# Patient Record
Sex: FEMALE | NOT HISPANIC OR LATINO | URBAN - METROPOLITAN AREA
[De-identification: names, ages, dates, MRNs, and addresses within clinical notes are randomized per-mention and may not be internally consistent; named-entity substitution may affect disease eponyms.]

---

## 2017-11-11 ENCOUNTER — EMERGENCY (EMERGENCY)
Facility: HOSPITAL | Age: 32
LOS: 1 days | Discharge: HOME | End: 2017-11-11
Attending: EMERGENCY MEDICINE | Admitting: EMERGENCY MEDICINE
Payer: MEDICAID

## 2017-11-11 VITALS
RESPIRATION RATE: 18 BRPM | DIASTOLIC BLOOD PRESSURE: 81 MMHG | SYSTOLIC BLOOD PRESSURE: 114 MMHG | OXYGEN SATURATION: 100 % | TEMPERATURE: 98 F | HEART RATE: 71 BPM

## 2017-11-11 LAB
ALBUMIN SERPL ELPH-MCNC: 4.3 G/DL — SIGNIFICANT CHANGE UP (ref 3.3–5)
ALP SERPL-CCNC: 58 U/L — SIGNIFICANT CHANGE UP (ref 40–120)
ALT FLD-CCNC: 18 U/L — SIGNIFICANT CHANGE UP (ref 4–33)
APPEARANCE UR: CLEAR — SIGNIFICANT CHANGE UP
AST SERPL-CCNC: 19 U/L — SIGNIFICANT CHANGE UP (ref 4–32)
BASOPHILS # BLD AUTO: 0.02 K/UL — SIGNIFICANT CHANGE UP (ref 0–0.2)
BASOPHILS NFR BLD AUTO: 0.2 % — SIGNIFICANT CHANGE UP (ref 0–2)
BILIRUB SERPL-MCNC: 0.2 MG/DL — SIGNIFICANT CHANGE UP (ref 0.2–1.2)
BILIRUB UR-MCNC: NEGATIVE — SIGNIFICANT CHANGE UP
BLOOD UR QL VISUAL: HIGH
BUN SERPL-MCNC: 15 MG/DL — SIGNIFICANT CHANGE UP (ref 7–23)
CALCIUM SERPL-MCNC: 8.8 MG/DL — SIGNIFICANT CHANGE UP (ref 8.4–10.5)
CHLORIDE SERPL-SCNC: 105 MMOL/L — SIGNIFICANT CHANGE UP (ref 98–107)
CO2 SERPL-SCNC: 26 MMOL/L — SIGNIFICANT CHANGE UP (ref 22–31)
COLOR SPEC: YELLOW — SIGNIFICANT CHANGE UP
CREAT SERPL-MCNC: 0.58 MG/DL — SIGNIFICANT CHANGE UP (ref 0.5–1.3)
EOSINOPHIL # BLD AUTO: 0.22 K/UL — SIGNIFICANT CHANGE UP (ref 0–0.5)
EOSINOPHIL NFR BLD AUTO: 2.1 % — SIGNIFICANT CHANGE UP (ref 0–6)
GLUCOSE SERPL-MCNC: 106 MG/DL — HIGH (ref 70–99)
GLUCOSE UR-MCNC: NEGATIVE — SIGNIFICANT CHANGE UP
HCG SERPL-ACNC: < 5 MIU/ML — SIGNIFICANT CHANGE UP
HCT VFR BLD CALC: 37.5 % — SIGNIFICANT CHANGE UP (ref 34.5–45)
HGB BLD-MCNC: 12.3 G/DL — SIGNIFICANT CHANGE UP (ref 11.5–15.5)
IMM GRANULOCYTES # BLD AUTO: 0.03 # — SIGNIFICANT CHANGE UP
IMM GRANULOCYTES NFR BLD AUTO: 0.3 % — SIGNIFICANT CHANGE UP (ref 0–1.5)
KETONES UR-MCNC: NEGATIVE — SIGNIFICANT CHANGE UP
LEUKOCYTE ESTERASE UR-ACNC: SIGNIFICANT CHANGE UP
LYMPHOCYTES # BLD AUTO: 3.3 K/UL — SIGNIFICANT CHANGE UP (ref 1–3.3)
LYMPHOCYTES # BLD AUTO: 31.9 % — SIGNIFICANT CHANGE UP (ref 13–44)
MCHC RBC-ENTMCNC: 27.3 PG — SIGNIFICANT CHANGE UP (ref 27–34)
MCHC RBC-ENTMCNC: 32.8 % — SIGNIFICANT CHANGE UP (ref 32–36)
MCV RBC AUTO: 83.1 FL — SIGNIFICANT CHANGE UP (ref 80–100)
MONOCYTES # BLD AUTO: 0.64 K/UL — SIGNIFICANT CHANGE UP (ref 0–0.9)
MONOCYTES NFR BLD AUTO: 6.2 % — SIGNIFICANT CHANGE UP (ref 2–14)
MUCOUS THREADS # UR AUTO: SIGNIFICANT CHANGE UP
NEUTROPHILS # BLD AUTO: 6.14 K/UL — SIGNIFICANT CHANGE UP (ref 1.8–7.4)
NEUTROPHILS NFR BLD AUTO: 59.3 % — SIGNIFICANT CHANGE UP (ref 43–77)
NITRITE UR-MCNC: NEGATIVE — SIGNIFICANT CHANGE UP
NRBC # FLD: 0 — SIGNIFICANT CHANGE UP
PH UR: 6.5 — SIGNIFICANT CHANGE UP (ref 4.6–8)
PLATELET # BLD AUTO: 268 K/UL — SIGNIFICANT CHANGE UP (ref 150–400)
PMV BLD: 10.1 FL — SIGNIFICANT CHANGE UP (ref 7–13)
POTASSIUM SERPL-MCNC: 4.5 MMOL/L — SIGNIFICANT CHANGE UP (ref 3.5–5.3)
POTASSIUM SERPL-SCNC: 4.5 MMOL/L — SIGNIFICANT CHANGE UP (ref 3.5–5.3)
PROT SERPL-MCNC: 7.8 G/DL — SIGNIFICANT CHANGE UP (ref 6–8.3)
PROT UR-MCNC: 20 — SIGNIFICANT CHANGE UP
RBC # BLD: 4.51 M/UL — SIGNIFICANT CHANGE UP (ref 3.8–5.2)
RBC # FLD: 13.1 % — SIGNIFICANT CHANGE UP (ref 10.3–14.5)
RBC CASTS # UR COMP ASSIST: >50 — HIGH (ref 0–?)
SODIUM SERPL-SCNC: 143 MMOL/L — SIGNIFICANT CHANGE UP (ref 135–145)
SP GR SPEC: 1.02 — SIGNIFICANT CHANGE UP (ref 1–1.03)
SQUAMOUS # UR AUTO: SIGNIFICANT CHANGE UP
UROBILINOGEN FLD QL: NORMAL E.U. — SIGNIFICANT CHANGE UP (ref 0.1–0.2)
WBC # BLD: 10.35 K/UL — SIGNIFICANT CHANGE UP (ref 3.8–10.5)
WBC # FLD AUTO: 10.35 K/UL — SIGNIFICANT CHANGE UP (ref 3.8–10.5)
WBC UR QL: SIGNIFICANT CHANGE UP (ref 0–?)

## 2017-11-11 PROCEDURE — 76830 TRANSVAGINAL US NON-OB: CPT | Mod: 26

## 2017-11-11 PROCEDURE — 99284 EMERGENCY DEPT VISIT MOD MDM: CPT

## 2017-11-11 RX ORDER — SODIUM CHLORIDE 9 MG/ML
1000 INJECTION INTRAMUSCULAR; INTRAVENOUS; SUBCUTANEOUS ONCE
Qty: 0 | Refills: 0 | Status: COMPLETED | OUTPATIENT
Start: 2017-11-11 | End: 2017-11-11

## 2017-11-11 RX ORDER — ACETAMINOPHEN 500 MG
975 TABLET ORAL ONCE
Qty: 0 | Refills: 0 | Status: COMPLETED | OUTPATIENT
Start: 2017-11-11 | End: 2017-11-11

## 2017-11-11 RX ADMIN — Medication 975 MILLIGRAM(S): at 21:34

## 2017-11-11 RX ADMIN — SODIUM CHLORIDE 4000 MILLILITER(S): 9 INJECTION INTRAMUSCULAR; INTRAVENOUS; SUBCUTANEOUS at 21:34

## 2017-11-11 NOTE — ED ADULT TRIAGE NOTE - CHIEF COMPLAINT QUOTE
Pt c/o of possible dislodged IUD, has been having sever pain in cervial region, cannot get confortable, has begun bleeding today but it is not her time for her period LMP 10/18 was normal; IUD was placed 4 yrs ago. Pt c/o of vaginal bleeding, possible dislodged IUD, has been having severe pain in vagina/cervix cannot get comfortable, has begun bleeding today but it is not her time for her period LMP 10/18 was normal; IUD was placed 4 yrs ago.  No difficulty urinating.

## 2017-11-11 NOTE — ED ADULT NURSE NOTE - OBJECTIVE STATEMENT
pt arrives w/ c/o IUD malfunction. pt states she had IUD placed 4 years ago and when she had intercourse last night she felt like something broke and now has discomfort in her vagina. pt   states that he can feel the device. pt states after intercourse she now has vaginal bleeding but thinks her period came early and not having pain or large clots from the bleeding. pt appears comfortable and in NAD. waiting further orders will continue to monitor.

## 2017-11-11 NOTE — ED PROVIDER NOTE - MEDICAL DECISION MAKING DETAILS
likely a dislodged IUD, blood loss does not seem significant given no clots no tachycardia no lightheadedness. Plan: labs, ultrasound, if dislodged iud --> OB consult, pain control

## 2017-11-11 NOTE — ED ADULT NURSE NOTE - CHIEF COMPLAINT QUOTE
Pt c/o of vaginal bleeding, possible dislodged IUD, has been having severe pain in vagina/cervix cannot get comfortable, has begun bleeding today but it is not her time for her period LMP 10/18 was normal; IUD was placed 4 yrs ago.  No difficulty urinating.

## 2017-11-11 NOTE — ED PROVIDER NOTE - ATTENDING CONTRIBUTION TO CARE
[place]to  pt with 1 day of vaginal pain worse with intercourse  feels IUD was moving  denies discharge but states period began 5 days early  no abd pain  dysuria  fever  cough  SOB  exam  pt in NAD clear lungs  card RRR S1S2  abd benign [place]  pt with 1 day of vaginal pain worse with intercourse  feels IUD was moving  denies discharge but states period began 5 days early  no abd pain  dysuria  fever  cough  SOB  exam  pt in NAD clear lungs  card RRR S1S2  abd benign  pelvic- small blood at os  obcures full eval  of cervix  no adnexal tenderness

## 2017-11-11 NOTE — ED PROVIDER NOTE - OBJECTIVE STATEMENT
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 After, began having vag bleeding similar to usual period bleeding, brick red, no clots, 4-5 pads per day, similar amount to usual periods. Had IUD placed 4 years ago. Has not had this before. Has also had some white discharge for several months, treated with flagyl gel 3 months ago, not better. LMP 10/18.     PMD: Aron  OB: none  ROS: Denies fever, palpitations, chills, recent sickness, HA, vision changes, cough, SOB, chest pain, n/v/d/c, dysuria, hematuria, rash, new joint aches, sick contacts, easy bruising, and recent travel.

## 2017-11-11 NOTE — ED PROVIDER NOTE - PHYSICAL EXAMINATION
Gen: NAD, AOx3  Head: NCAT  HEENT: PERRL, oral mucosa moist, normal conjunctiva  Lung: CTAB, no respiratory distress  CV: rrr, no murmurs, Normal perfusion  Abd: soft, suprapubic tenderness, no CVA tenderness  : no external lesions, speculum - small blood in vaginal vault, no clots, cervix not visualized, bimanual - no cmt, central uterine tenderness, no adnexal tenderness   MSK: No edema, no visible deformities  Neuro: No focal neurologic deficits  Skin: No rash   Psych: normal affect

## 2017-11-12 VITALS
RESPIRATION RATE: 16 BRPM | SYSTOLIC BLOOD PRESSURE: 94 MMHG | DIASTOLIC BLOOD PRESSURE: 62 MMHG | OXYGEN SATURATION: 100 % | HEART RATE: 70 BPM

## 2017-11-12 DIAGNOSIS — T83.89XA OTHER SPECIFIED COMPLICATION OF GENITOURINARY PROSTHETIC DEVICES, IMPLANTS AND GRAFTS, INITIAL ENCOUNTER: ICD-10-CM

## 2017-11-12 PROCEDURE — 99283 EMERGENCY DEPT VISIT LOW MDM: CPT

## 2017-11-12 NOTE — CONSULT NOTE ADULT - PROBLEM SELECTOR RECOMMENDATION 9
- IUD in lower uterine segment, removed at bedside. Intact Multiload 375 IUD identified  - Bleeding consistent with normal periods  Pt counseled regarding contraceptive options, stressing that she is currently not protected and needs to use condoms while she decides. Pt to follow up with GYN outpatient. Contact info for [facility] provided.    Patient seen with Dr. Carter Smith, PGY2

## 2017-11-12 NOTE — CONSULT NOTE ADULT - SUBJECTIVE AND OBJECTIVE BOX
R2 GYN Consult Note    31yo  LMP 17       OBHx:  x2  GynHx: reg menses, no f/c/STIs  PMSH: s/p brain surgery for removal of benign tumor  All: NKDA  Meds: none  SocialHx: no smoking or drugs    Vital Signs Last 24 Hrs  T(C): 37 (2017 20:38), Max: 37 (2017 20:38)  T(F): 98.6 (2017 20:38), Max: 98.6 (2017 20:38)  HR: 70 (2017 00:19) (70 - 78)  BP: 94/62 (2017 00:19) (94/62 - 114/81)  RR: 16 (2017 00:19) (16 - 18)  SpO2: 100% (2017 00:19) (100% - 100%)    PE:  Gen: Comfortable, NAD  CV: RRR  Pulm: CTAB  Abd: Soft, NT  Ext: No edema or tenderness bilaterally  Spec Exam:    LABS:                        12.3   10.35 )-----------( 268      ( 2017 20:56 )             37.5     -    143  |  105  |  15  ----------------------------<  106<H>  4.5   |  26  |  0.58    Ca    8.8      2017 20:56    TPro  7.8  /  Alb  4.3  /  TBili  0.2  /  DBili  x   /  AST  19  /  ALT  18  /  AlkPhos  58  -      Urinalysis Basic - ( 2017 21:41 )    Color: YELLOW / Appearance: CLEAR / S.023 / pH: 6.5  Gluc: NEGATIVE / Ketone: NEGATIVE  / Bili: NEGATIVE / Urobili: NORMAL E.U.   Blood: MODERATE / Protein: 20 / Nitrite: NEGATIVE   Leuk Esterase: TRACE / RBC: >50 / WBC 0-2   Sq Epi: OCC / Non Sq Epi: x / Bacteria: x        RADIOLOGY & ADDITIONAL STUDIES:  < from: US Transvaginal (17 @ 23:52) >  Endometrium: 7 mm. Within normal limits. IUD abnormally positioned in the   lower uterine segment and is abutting the internal osof the cervix.    Right ovary: 2.4 x 2.3 x 2.2 cm. Within normal limits.    Left ovary: 2.2 x 2.9 x 2.3 cm. Within normal limits.    Fluid: None.    IMPRESSION:    Abnormally positioned IUD in the lower uterine segment with open cervix. R2 GYN Consult Note    31yo  LMP 17 presenting with complaints of acute onset intermittent pelvic pain that started during intercourse yesterday. Pt reports that her partner also felt something sharp, and since then she has been experiencing sharp crampy pain intermittently with changes in position. She has never felt pain like this before. Pt reports vaginal bleeding starting today, about 4 days earlier than expected. Per patient, In first two days, she uses 4-5 pads, and this is consistent with her normal period. Patient reports being treated for with antibiotics for an episode of vaginal discharge 3 months ago, this has since resolved.  Patient had IUD placed 4 years ago in [place], she is not sure what type of IUD or how long it is effective for. She has not seen a GYN in about 3 years since moving to . No Pap smears    OBHx:  x2  GynHx: reg menses, no f/c/STIs  PMSH: s/p brain surgery for removal of benign tumor  All: NKDA  Meds: none  SocialHx: no smoking or drugs    Vital Signs Last 24 Hrs  T(C): 37 (2017 20:38), Max: 37 (2017 20:38)  T(F): 98.6 (2017 20:38), Max: 98.6 (2017 20:38)  HR: 70 (2017 00:19) (70 - 78)  BP: 94/62 (2017 00:19) (94/62 - 114/81)  RR: 16 (2017 00:19) (16 - 18)  SpO2: 100% (2017 00:19) (100% - 100%)    PE:  Gen: Comfortable, NAD  CV: RRR  Pulm: CTAB  Abd: Soft, NT  Ext: No edema or tenderness bilaterally  Spec Exam: normal appearing cervix with visible strings protruding from os, base of IUD barely visible. Strings grasped with forceps and IUD removed intact  Bimanual: ~8cm anteverted uterus nontender, no CMT, no palpable adnexal masses or tenderness    LABS:                        12.3   10.35 )-----------( 268      ( 2017 20:56 )             37.5     11-11    143  |  105  |  15  ----------------------------<  106<H>  4.5   |  26  |  0.58    Ca    8.8      2017 20:56    TPro  7.8  /  Alb  4.3  /  TBili  0.2  /  DBili  x   /  AST  19  /  ALT  18  /  AlkPhos  58  11-11      Urinalysis Basic - ( 2017 21:41 )    Color: YELLOW / Appearance: CLEAR / S.023 / pH: 6.5  Gluc: NEGATIVE / Ketone: NEGATIVE  / Bili: NEGATIVE / Urobili: NORMAL E.U.   Blood: MODERATE / Protein: 20 / Nitrite: NEGATIVE   Leuk Esterase: TRACE / RBC: >50 / WBC 0-2   Sq Epi: OCC / Non Sq Epi: x / Bacteria: x        RADIOLOGY & ADDITIONAL STUDIES:  < from: US Transvaginal (17 @ 23:52) >  Endometrium: 7 mm. Within normal limits. IUD abnormally positioned in the   lower uterine segment and is abutting the internal osof the cervix.    Right ovary: 2.4 x 2.3 x 2.2 cm. Within normal limits.    Left ovary: 2.2 x 2.9 x 2.3 cm. Within normal limits.    Fluid: None.    IMPRESSION:    Abnormally positioned IUD in the lower uterine segment with open cervix.

## 2017-11-12 NOTE — CONSULT NOTE ADULT - ASSESSMENT
31yo  LMP 17 with acute onset bilateral pelvic pain/cramping, found to have malpositioned IUD 33yo  LMP 17 with acute onset bilateral pelvic pain/cramping, found to have malpositioned IUD    Pt is hemodynamically stable, without sx of infection

## 2017-11-13 LAB
BACTERIA UR CULT: SIGNIFICANT CHANGE UP
SPECIMEN SOURCE: SIGNIFICANT CHANGE UP

## 2018-09-11 PROBLEM — Z00.00 ENCOUNTER FOR PREVENTIVE HEALTH EXAMINATION: Status: ACTIVE | Noted: 2018-09-11

## 2018-09-24 ENCOUNTER — APPOINTMENT (OUTPATIENT)
Dept: HUMAN REPRODUCTION | Facility: CLINIC | Age: 33
End: 2018-09-24
Payer: MEDICAID

## 2018-09-24 PROCEDURE — 36415 COLL VENOUS BLD VENIPUNCTURE: CPT

## 2018-09-24 PROCEDURE — 76830 TRANSVAGINAL US NON-OB: CPT

## 2018-09-24 PROCEDURE — 99205 OFFICE O/P NEW HI 60 MIN: CPT | Mod: 25

## 2018-09-24 RX ORDER — DOXYCYCLINE HYCLATE 100 MG/1
100 TABLET ORAL
Qty: 8 | Refills: 0 | Status: ACTIVE | COMMUNITY
Start: 2018-09-24 | End: 1900-01-01

## 2022-07-11 ENCOUNTER — APPOINTMENT (OUTPATIENT)
Dept: ORTHOPEDIC SURGERY | Facility: CLINIC | Age: 37
End: 2022-07-11